# Patient Record
(demographics unavailable — no encounter records)

---

## 2025-02-25 NOTE — HISTORY OF PRESENT ILLNESS
[FreeTextEntry1] : 70 YOF with PMHx of anxiety, GERD, HTN, HLD, IGT, and alpha gal allergy presents for a follow up.  [de-identified] : 70 YOF with PMHx of anxiety, GERD, HTN, HLD, IGT, and alpha gal allergy presents for a follow up. Patient states she is overall doing well and has no acute complaints. Patient requesting refills on medications. Patient states she hasn't had beef or pork in months due to the allergy. Patient denies recent illnesses or hospitalizations. Patients denies fever, chills, chest pain, SOB, abdominal pain, N/V/D.

## 2025-02-25 NOTE — PLAN
[FreeTextEntry1] : 70 YOF with PMHx of anxiety, GERD, HTN, HLD, IGT, and alpha gal allergy presents for a follow up. Patient states she is overall doing well and has no acute complaints. Patient requesting refills on medications. Patient states she hasn't had beef or pork in months due to the allergy. Patient denies recent illnesses or hospitalizations. Patients denies fever, chills, chest pain, SOB, abdominal pain, N/V/D.   -care plan reviewed -medications reviewed and renewed -labs drawn -RTC 3 months

## 2025-02-25 NOTE — HISTORY OF PRESENT ILLNESS
[FreeTextEntry1] : 70 YOF with PMHx of anxiety, GERD, HTN, HLD, IGT, and alpha gal allergy presents for a follow up.  [de-identified] : 70 YOF with PMHx of anxiety, GERD, HTN, HLD, IGT, and alpha gal allergy presents for a follow up. Patient states she is overall doing well and has no acute complaints. Patient requesting refills on medications. Patient states she hasn't had beef or pork in months due to the allergy. Patient denies recent illnesses or hospitalizations. Patients denies fever, chills, chest pain, SOB, abdominal pain, N/V/D.

## 2025-04-16 NOTE — REVIEW OF SYSTEMS
[Joint Pain] : joint pain [Negative] : Heme/Lymph [Fatigue] : fatigue [Nasal Discharge] : nasal discharge [Sore Throat] : sore throat [Postnasal Drip] : postnasal drip [Fever] : no fever [Chills] : no chills [Night Sweats] : no night sweats [Recent Change In Weight] : ~T no recent weight change [Earache] : no earache [Hearing Loss] : no hearing loss [Nosebleed] : no nosebleeds [Joint Stiffness] : no joint stiffness [Joint Swelling] : no joint swelling [Muscle Weakness] : no muscle weakness [Muscle Pain] : no muscle pain [Back Pain] : no back pain

## 2025-04-16 NOTE — ASSESSMENT
[FreeTextEntry1] : 70 YOF with PMHx of anxiety, GERD, HTN, HLD, IGT, and alpha gal allergy presents due to cold symptoms. Patient states that she has sore throat that started 2 days ago. She also admits to congestion, cough, and fatigue that started around the same time. She states that she took her temperature yesterday and it was 99.1F but has dropped and she has not had a fever since. Denies sick contacts. Patient denies recent illnesses or hospitalizations. Patients denies night sweats, chills, chest pain, SOB, abdominal pain, N/V/D.

## 2025-04-16 NOTE — PLAN
[FreeTextEntry1] : 70 YOF with PMHx of anxiety, GERD, HTN, HLD, IGT, and alpha gal allergy presents due to cold symptoms. Patient states that she has sore throat that started 2 days ago. She also admits to congestion, cough, and fatigue that started around the same time. She states that she took her temperature yesterday and it was 99.1F but has dropped and she has not had a fever since. Denies sick contacts. Patient denies recent illnesses or hospitalizations. Patients denies night sweats, chills, chest pain, SOB, abdominal pain, N/V/D.   -care plan reviewed -medications reviewed -labs drawn -ordered Augmentin 875-125 mg BID x 14 days -RTC if symptoms do not improve

## 2025-04-16 NOTE — HISTORY OF PRESENT ILLNESS
[Congestion] : congestion [Cold Symptoms] : cold symptoms [Sore Throat] : sore throat [FreeTextEntry8] : 70 YOF with PMHx of anxiety, GERD, HTN, HLD, IGT, and alpha gal allergy presents due to cold symptoms. Patient states that she has sore throat that started 2 days ago. She also admits to congestion, cough, and fatigue that started around the same time. She states that she took her temperature yesterday and it was 99.1F but has dropped and she has not had a fever since. Denies sick contacts. Patient denies recent illnesses or hospitalizations. Patients denies night sweats, chills, chest pain, SOB, abdominal pain, N/V/D.